# Patient Record
Sex: MALE | Race: OTHER | Employment: OTHER | URBAN - METROPOLITAN AREA
[De-identification: names, ages, dates, MRNs, and addresses within clinical notes are randomized per-mention and may not be internally consistent; named-entity substitution may affect disease eponyms.]

---

## 2018-09-24 ENCOUNTER — HOSPITAL ENCOUNTER (INPATIENT)
Facility: HOSPITAL | Age: 63
LOS: 3 days | Discharge: HOME | DRG: 247 | End: 2018-09-27
Attending: EMERGENCY MEDICINE | Admitting: HOSPITALIST

## 2018-09-24 ENCOUNTER — APPOINTMENT (OUTPATIENT)
Dept: GENERAL RADIOLOGY | Facility: HOSPITAL | Age: 63
DRG: 247 | End: 2018-09-24
Attending: EMERGENCY MEDICINE

## 2018-09-24 DIAGNOSIS — I21.4 NSTEMI, INITIAL EPISODE OF CARE (HCC): Primary | ICD-10-CM

## 2018-09-24 DIAGNOSIS — R07.9 ACUTE CHEST PAIN: ICD-10-CM

## 2018-09-24 LAB
ANION GAP SERPL CALC-SCNC: 13 MMOL/L (ref 0–18)
APTT PPP: 25.9 SECONDS (ref 23.2–35.3)
BASOPHILS # BLD: 0.1 K/UL (ref 0–0.2)
BASOPHILS NFR BLD: 0 %
BUN SERPL-MCNC: 10 MG/DL (ref 8–20)
BUN/CREAT SERPL: 12.8 (ref 10–20)
CALCIUM SERPL-MCNC: 10 MG/DL (ref 8.5–10.5)
CHLORIDE SERPL-SCNC: 101 MMOL/L (ref 95–110)
CHOLEST SERPL-MCNC: 226 MG/DL (ref 110–200)
CO2 SERPL-SCNC: 23 MMOL/L (ref 22–32)
CREAT SERPL-MCNC: 0.78 MG/DL (ref 0.5–1.5)
EOSINOPHIL # BLD: 0 K/UL (ref 0–0.7)
EOSINOPHIL NFR BLD: 0 %
ERYTHROCYTE [DISTWIDTH] IN BLOOD BY AUTOMATED COUNT: 13 % (ref 11–15)
GLUCOSE SERPL-MCNC: 103 MG/DL (ref 70–99)
HBA1C MFR BLD: 4.8 % (ref 4–6)
HCT VFR BLD AUTO: 43.3 % (ref 41–52)
HDLC SERPL-MCNC: 44 MG/DL
HGB BLD-MCNC: 15.1 G/DL (ref 13.5–17.5)
INR BLD: 1 (ref 0.9–1.2)
LDLC SERPL CALC-MCNC: 146 MG/DL (ref 0–99)
LYMPHOCYTES # BLD: 1.7 K/UL (ref 1–4)
LYMPHOCYTES NFR BLD: 11 %
MCH RBC QN AUTO: 31.4 PG (ref 27–32)
MCHC RBC AUTO-ENTMCNC: 34.9 G/DL (ref 32–37)
MCV RBC AUTO: 90.1 FL (ref 80–100)
MONOCYTES # BLD: 0.8 K/UL (ref 0–1)
MONOCYTES NFR BLD: 5 %
NEUTROPHILS # BLD AUTO: 12.3 K/UL (ref 1.8–7.7)
NEUTROPHILS NFR BLD: 83 %
NONHDLC SERPL-MCNC: 182 MG/DL
OSMOLALITY UR CALC.SUM OF ELEC: 283 MOSM/KG (ref 275–295)
PLATELET # BLD AUTO: 307 K/UL (ref 140–400)
PMV BLD AUTO: 7.8 FL (ref 7.4–10.3)
POTASSIUM SERPL-SCNC: 3.6 MMOL/L (ref 3.3–5.1)
PROTHROMBIN TIME: 13.3 SECONDS (ref 11.8–14.5)
RBC # BLD AUTO: 4.81 M/UL (ref 4.5–5.9)
SODIUM SERPL-SCNC: 137 MMOL/L (ref 136–144)
TRIGL SERPL-MCNC: 179 MG/DL (ref 1–149)
TROPONIN I SERPL-MCNC: 12.57 NG/ML (ref ?–0.03)
TROPONIN I SERPL-MCNC: 8.57 NG/ML (ref ?–0.03)
WBC # BLD AUTO: 14.9 K/UL (ref 4–11)

## 2018-09-24 PROCEDURE — 84484 ASSAY OF TROPONIN QUANT: CPT | Performed by: EMERGENCY MEDICINE

## 2018-09-24 PROCEDURE — 96365 THER/PROPH/DIAG IV INF INIT: CPT

## 2018-09-24 PROCEDURE — 85610 PROTHROMBIN TIME: CPT | Performed by: INTERNAL MEDICINE

## 2018-09-24 PROCEDURE — 99291 CRITICAL CARE FIRST HOUR: CPT

## 2018-09-24 PROCEDURE — 85025 COMPLETE CBC W/AUTO DIFF WBC: CPT | Performed by: EMERGENCY MEDICINE

## 2018-09-24 PROCEDURE — 93005 ELECTROCARDIOGRAM TRACING: CPT

## 2018-09-24 PROCEDURE — 93010 ELECTROCARDIOGRAM REPORT: CPT | Performed by: EMERGENCY MEDICINE

## 2018-09-24 PROCEDURE — 85730 THROMBOPLASTIN TIME PARTIAL: CPT | Performed by: EMERGENCY MEDICINE

## 2018-09-24 PROCEDURE — 71045 X-RAY EXAM CHEST 1 VIEW: CPT | Performed by: EMERGENCY MEDICINE

## 2018-09-24 PROCEDURE — 83036 HEMOGLOBIN GLYCOSYLATED A1C: CPT | Performed by: INTERNAL MEDICINE

## 2018-09-24 PROCEDURE — 80061 LIPID PANEL: CPT | Performed by: EMERGENCY MEDICINE

## 2018-09-24 PROCEDURE — 80048 BASIC METABOLIC PNL TOTAL CA: CPT | Performed by: EMERGENCY MEDICINE

## 2018-09-24 RX ORDER — ALPRAZOLAM 0.25 MG/1
0.25 TABLET ORAL NIGHTLY PRN
Status: DISCONTINUED | OUTPATIENT
Start: 2018-09-24 | End: 2018-09-26

## 2018-09-24 RX ORDER — NITROGLYCERIN 0.4 MG/1
0.4 TABLET SUBLINGUAL ONCE
Status: COMPLETED | OUTPATIENT
Start: 2018-09-24 | End: 2018-09-24

## 2018-09-24 RX ORDER — HEPARIN SODIUM 1000 [USP'U]/ML
5000 INJECTION, SOLUTION INTRAVENOUS; SUBCUTANEOUS ONCE
Status: COMPLETED | OUTPATIENT
Start: 2018-09-24 | End: 2018-09-24

## 2018-09-24 RX ORDER — ONDANSETRON 2 MG/ML
4 INJECTION INTRAMUSCULAR; INTRAVENOUS EVERY 4 HOURS PRN
Status: DISCONTINUED | OUTPATIENT
Start: 2018-09-24 | End: 2018-09-27

## 2018-09-24 RX ORDER — HEPARIN SODIUM AND DEXTROSE 10000; 5 [USP'U]/100ML; G/100ML
INJECTION INTRAVENOUS CONTINUOUS
Status: DISCONTINUED | OUTPATIENT
Start: 2018-09-25 | End: 2018-09-26

## 2018-09-24 RX ORDER — ASPIRIN 81 MG/1
324 TABLET, CHEWABLE ORAL ONCE
Status: COMPLETED | OUTPATIENT
Start: 2018-09-24 | End: 2018-09-25

## 2018-09-24 RX ORDER — DOCUSATE SODIUM 100 MG/1
100 CAPSULE, LIQUID FILLED ORAL 2 TIMES DAILY
Status: DISCONTINUED | OUTPATIENT
Start: 2018-09-24 | End: 2018-09-26

## 2018-09-24 RX ORDER — CHLORHEXIDINE GLUCONATE 4 G/100ML
30 SOLUTION TOPICAL
Status: COMPLETED | OUTPATIENT
Start: 2018-09-25 | End: 2018-09-25

## 2018-09-24 RX ORDER — NITROGLYCERIN 20 MG/100ML
10 INJECTION INTRAVENOUS CONTINUOUS
Status: DISCONTINUED | OUTPATIENT
Start: 2018-09-24 | End: 2018-09-25

## 2018-09-24 RX ORDER — SODIUM CHLORIDE 9 MG/ML
INJECTION, SOLUTION INTRAVENOUS CONTINUOUS
Status: DISCONTINUED | OUTPATIENT
Start: 2018-09-24 | End: 2018-09-26

## 2018-09-24 RX ORDER — ATORVASTATIN CALCIUM 40 MG/1
40 TABLET, FILM COATED ORAL NIGHTLY
Status: DISCONTINUED | OUTPATIENT
Start: 2018-09-24 | End: 2018-09-27

## 2018-09-24 RX ORDER — POLYETHYLENE GLYCOL 3350 17 G/17G
17 POWDER, FOR SOLUTION ORAL DAILY PRN
Status: DISCONTINUED | OUTPATIENT
Start: 2018-09-24 | End: 2018-09-27

## 2018-09-24 RX ORDER — SODIUM PHOSPHATE, DIBASIC AND SODIUM PHOSPHATE, MONOBASIC 7; 19 G/133ML; G/133ML
1 ENEMA RECTAL ONCE AS NEEDED
Status: DISCONTINUED | OUTPATIENT
Start: 2018-09-24 | End: 2018-09-27

## 2018-09-24 RX ORDER — BISACODYL 10 MG
10 SUPPOSITORY, RECTAL RECTAL
Status: DISCONTINUED | OUTPATIENT
Start: 2018-09-24 | End: 2018-09-27

## 2018-09-24 RX ORDER — EPTIFIBATIDE 0.75 MG/ML
2 INJECTION, SOLUTION INTRAVENOUS CONTINUOUS
Status: DISCONTINUED | OUTPATIENT
Start: 2018-09-24 | End: 2018-09-27

## 2018-09-24 RX ORDER — SODIUM CHLORIDE 0.9 % (FLUSH) 0.9 %
3 SYRINGE (ML) INJECTION AS NEEDED
Status: DISCONTINUED | OUTPATIENT
Start: 2018-09-24 | End: 2018-09-27

## 2018-09-24 RX ORDER — ACETAMINOPHEN 325 MG/1
650 TABLET ORAL EVERY 6 HOURS PRN
Status: DISCONTINUED | OUTPATIENT
Start: 2018-09-24 | End: 2018-09-27

## 2018-09-24 RX ORDER — HEPARIN SODIUM AND DEXTROSE 10000; 5 [USP'U]/100ML; G/100ML
12 INJECTION INTRAVENOUS ONCE
Status: COMPLETED | OUTPATIENT
Start: 2018-09-24 | End: 2018-09-25

## 2018-09-24 RX ORDER — ASPIRIN 81 MG/1
324 TABLET, CHEWABLE ORAL ONCE
Status: COMPLETED | OUTPATIENT
Start: 2018-09-24 | End: 2018-09-24

## 2018-09-25 ENCOUNTER — APPOINTMENT (OUTPATIENT)
Dept: INTERVENTIONAL RADIOLOGY/VASCULAR | Facility: HOSPITAL | Age: 63
DRG: 247 | End: 2018-09-25
Attending: INTERNAL MEDICINE

## 2018-09-25 ENCOUNTER — APPOINTMENT (OUTPATIENT)
Dept: CV DIAGNOSTICS | Facility: HOSPITAL | Age: 63
DRG: 247 | End: 2018-09-25
Attending: INTERNAL MEDICINE

## 2018-09-25 LAB
ANION GAP SERPL CALC-SCNC: 10 MMOL/L (ref 0–18)
APTT PPP: 37.1 SECONDS (ref 23.2–35.3)
APTT PPP: 66.8 SECONDS (ref 23.2–35.3)
BUN SERPL-MCNC: 9 MG/DL (ref 8–20)
BUN/CREAT SERPL: 12.5 (ref 10–20)
CALCIUM SERPL-MCNC: 9.7 MG/DL (ref 8.5–10.5)
CHLORIDE SERPL-SCNC: 101 MMOL/L (ref 95–110)
CO2 SERPL-SCNC: 24 MMOL/L (ref 22–32)
CREAT SERPL-MCNC: 0.72 MG/DL (ref 0.5–1.5)
ERYTHROCYTE [DISTWIDTH] IN BLOOD BY AUTOMATED COUNT: 12.9 % (ref 11–15)
GLUCOSE SERPL-MCNC: 118 MG/DL (ref 70–99)
HCT VFR BLD AUTO: 42.8 % (ref 41–52)
HGB BLD-MCNC: 14.9 G/DL (ref 13.5–17.5)
INR BLD: 1.1 (ref 0.9–1.2)
MAGNESIUM SERPL-MCNC: 1.8 MG/DL (ref 1.8–2.5)
MCH RBC QN AUTO: 31.5 PG (ref 27–32)
MCHC RBC AUTO-ENTMCNC: 34.9 G/DL (ref 32–37)
MCV RBC AUTO: 90.5 FL (ref 80–100)
OSMOLALITY UR CALC.SUM OF ELEC: 280 MOSM/KG (ref 275–295)
PLATELET # BLD AUTO: 290 K/UL (ref 140–400)
PMV BLD AUTO: 7.9 FL (ref 7.4–10.3)
POTASSIUM SERPL-SCNC: 3.7 MMOL/L (ref 3.3–5.1)
PROTHROMBIN TIME: 14.3 SECONDS (ref 11.8–14.5)
RBC # BLD AUTO: 4.73 M/UL (ref 4.5–5.9)
SODIUM SERPL-SCNC: 135 MMOL/L (ref 136–144)
TSH SERPL-ACNC: 0.83 UIU/ML (ref 0.45–5.33)
WBC # BLD AUTO: 15.1 K/UL (ref 4–11)

## 2018-09-25 PROCEDURE — 85027 COMPLETE CBC AUTOMATED: CPT | Performed by: INTERNAL MEDICINE

## 2018-09-25 PROCEDURE — 99152 MOD SED SAME PHYS/QHP 5/>YRS: CPT

## 2018-09-25 PROCEDURE — 85730 THROMBOPLASTIN TIME PARTIAL: CPT | Performed by: INTERNAL MEDICINE

## 2018-09-25 PROCEDURE — 93306 TTE W/DOPPLER COMPLETE: CPT | Performed by: INTERNAL MEDICINE

## 2018-09-25 PROCEDURE — 84443 ASSAY THYROID STIM HORMONE: CPT | Performed by: INTERNAL MEDICINE

## 2018-09-25 PROCEDURE — 93010 ELECTROCARDIOGRAM REPORT: CPT | Performed by: INTERNAL MEDICINE

## 2018-09-25 PROCEDURE — 027034Z DILATION OF CORONARY ARTERY, ONE ARTERY WITH DRUG-ELUTING INTRALUMINAL DEVICE, PERCUTANEOUS APPROACH: ICD-10-PCS | Performed by: INTERNAL MEDICINE

## 2018-09-25 PROCEDURE — 93454 CORONARY ARTERY ANGIO S&I: CPT

## 2018-09-25 PROCEDURE — 93005 ELECTROCARDIOGRAM TRACING: CPT

## 2018-09-25 PROCEDURE — B2111ZZ FLUOROSCOPY OF MULTIPLE CORONARY ARTERIES USING LOW OSMOLAR CONTRAST: ICD-10-PCS | Performed by: INTERNAL MEDICINE

## 2018-09-25 PROCEDURE — 80048 BASIC METABOLIC PNL TOTAL CA: CPT | Performed by: INTERNAL MEDICINE

## 2018-09-25 PROCEDURE — 83735 ASSAY OF MAGNESIUM: CPT | Performed by: INTERNAL MEDICINE

## 2018-09-25 PROCEDURE — 85610 PROTHROMBIN TIME: CPT | Performed by: INTERNAL MEDICINE

## 2018-09-25 PROCEDURE — 99153 MOD SED SAME PHYS/QHP EA: CPT

## 2018-09-25 PROCEDURE — A4216 STERILE WATER/SALINE, 10 ML: HCPCS | Performed by: INTERNAL MEDICINE

## 2018-09-25 PROCEDURE — 4A023N7 MEASUREMENT OF CARDIAC SAMPLING AND PRESSURE, LEFT HEART, PERCUTANEOUS APPROACH: ICD-10-PCS | Performed by: INTERNAL MEDICINE

## 2018-09-25 RX ORDER — ASPIRIN 325 MG
325 TABLET, DELAYED RELEASE (ENTERIC COATED) ORAL DAILY
Status: DISCONTINUED | OUTPATIENT
Start: 2018-09-26 | End: 2018-09-26

## 2018-09-25 RX ORDER — HEPARIN SODIUM 1000 [USP'U]/ML
30 INJECTION, SOLUTION INTRAVENOUS; SUBCUTANEOUS ONCE
Status: COMPLETED | OUTPATIENT
Start: 2018-09-25 | End: 2018-09-25

## 2018-09-25 RX ORDER — MIDAZOLAM HYDROCHLORIDE 1 MG/ML
INJECTION INTRAMUSCULAR; INTRAVENOUS
Status: COMPLETED
Start: 2018-09-25 | End: 2018-09-25

## 2018-09-25 RX ORDER — WARFARIN SODIUM 5 MG/1
5 TABLET ORAL NIGHTLY
Status: DISCONTINUED | OUTPATIENT
Start: 2018-09-25 | End: 2018-09-27

## 2018-09-25 RX ORDER — EPTIFIBATIDE 0.75 MG/ML
1 INJECTION, SOLUTION INTRAVENOUS CONTINUOUS
Status: DISPENSED | OUTPATIENT
Start: 2018-09-25 | End: 2018-09-26

## 2018-09-25 RX ORDER — PRASUGREL 10 MG/1
TABLET, FILM COATED ORAL
Status: COMPLETED
Start: 2018-09-25 | End: 2018-09-25

## 2018-09-25 RX ORDER — PRASUGREL 10 MG/1
10 TABLET, FILM COATED ORAL DAILY
Status: DISCONTINUED | OUTPATIENT
Start: 2018-09-26 | End: 2018-09-27

## 2018-09-25 RX ORDER — VERAPAMIL HYDROCHLORIDE 2.5 MG/ML
INJECTION, SOLUTION INTRAVENOUS
Status: COMPLETED
Start: 2018-09-25 | End: 2018-09-25

## 2018-09-25 RX ORDER — NITROGLYCERIN 20 MG/100ML
INJECTION INTRAVENOUS
Status: COMPLETED
Start: 2018-09-25 | End: 2018-09-25

## 2018-09-25 RX ORDER — SODIUM CHLORIDE 9 MG/ML
INJECTION, SOLUTION INTRAVENOUS CONTINUOUS
Status: ACTIVE | OUTPATIENT
Start: 2018-09-25 | End: 2018-09-25

## 2018-09-25 RX ORDER — MIDAZOLAM HYDROCHLORIDE 1 MG/ML
INJECTION INTRAMUSCULAR; INTRAVENOUS
Status: DISCONTINUED
Start: 2018-09-25 | End: 2018-09-25 | Stop reason: WASHOUT

## 2018-09-25 RX ORDER — HEPARIN SODIUM 1000 [USP'U]/ML
INJECTION, SOLUTION INTRAVENOUS; SUBCUTANEOUS
Status: COMPLETED
Start: 2018-09-25 | End: 2018-09-25

## 2018-09-25 NOTE — ED NOTES
Language line used to translate for patient. Sarah [phone]. Diagnosis explained, medications reviewed and patient updated on plan of care. Friends at bedside. Patient verbalized understanding. Critical troponin reported to Dr. Mg Alves.

## 2018-09-25 NOTE — PROGRESS NOTES
Post cath note  cath showed subtotaled lad in mid vessel  circ and rca were ok  Echo lv showed ant apical hypokinesia with apical clot; ef 45%  Lad was opened with lore and raymundo 2-3 flow  Pt started on effient and aspirin  Integrilin will run for 18 hours

## 2018-09-25 NOTE — ED PROVIDER NOTES
Patient Seen in: [facility] AND [facility] Emergency Department    History   Patient presents with:  Chest Pain Angina (cardiovascular)      HPI    The patient presents to the ED with his family concerned about right-sided chest pain that radiates to his right u Narrative      Not on file      ROS  Pertinent Positives: Chest pain  All other organ systems are reviewed and are negative. Constitutional and vital signs reviewed.       Social History and Family History elements reviewed from today, pertinent positive All other components within normal limits   CBC W/ DIFFERENTIAL - Abnormal; Notable for the following components:    WBC 14.9 (*)     Neutrophil Absolute 12.3 (*)     All other components within normal limits   PTT, ACTIVATED - Normal   HEMOGLOBIN A1C - No (INTEGRILIN) bolus injection 16,620 mcg (not administered)   eptifibatide (INTEGRILIN) 75 MG/100ML infusion (not administered)   aspirin chewable tab 324 mg (324 mg Oral Given 9/24/18 1906)   nitroGLYCERIN (NITROSTAT) SL tab 0.4 mg (0.4 mg Sublingual Given obtaining history, performing a physical exam, bedside monitoring of interventions, collecting and interpreting tests and discussion with consultants but not including time spent performing procedures.     Condition upon leaving the department: Stable    Leo Sow

## 2018-09-25 NOTE — H&P
[facility] Hospitalist H&P       CC: Patient presents with:  Chest Pain Angina (cardiovascular)       PCP: No primary care provider on file. ASSESSMENT / PLAN:   Patient is a 57year old male with PMHx sig for HTN who presents with a c/co of chest pain.     NST Alcohol use: No      Frequency: Never       Fam Hx  Denied family hx of heart disease    Review of Systems  Pertinent positives and negatives noted above in HPI, all other systems reviewed and are negative     OBJECTIVE:  /86 (BP Location: Right arm) 09/25/2018    CREATSERUM 0.72 09/25/2018    BUN 9 09/25/2018     09/25/2018    K 3.7 09/25/2018     09/25/2018    CO2 24 09/25/2018     09/25/2018    CA 9.7 09/25/2018    PTT 66.8 09/25/2018    INR 1.1 09/25/2018    TSH 0.83 09/25/2018

## 2018-09-25 NOTE — CM/SW NOTE
SW received MD order to eval for home needs, social support and ability to obtain medications. Per chart review and discussion w/ MEIR/Nuha, pt is from home w/ his spouse in [facility], [facility].  Pt is Irish speaking only and has a relative contact phon

## 2018-09-25 NOTE — PHYSICAL THERAPY NOTE
Attempted PT assessment- hold today per RNFrancisca. Blood thinners initiated and not appropriate for mobility at this time.  Will reschedule for 9/26

## 2018-09-25 NOTE — CONSULTS
Reason for Consultation: Acute coronary syndrome    Assessment/Plan:     1. Acute MI  - started 20 hours ago  - presently pain-free    PLAN:  - heparin, ASA.  IV NTG, Integrilin  - BB  - cath possible in AM    Discussed through family friend who translated 1930 (!) 153/96 — — 63 19 96 % — —   09/24/18 1915 (!) 182/96 — — 113 20 94 % — —   09/24/18 1910 (!) 182/96 — — 64 18 96 % — —   09/24/18 1819 (!) 174/95 98.2 °F (36.8 °C) Oral 69 19 97 % 6' 0.84\" (1.85 m) 187 lb 6.3 oz (85 kg)     No intake or output da recognition system and may contain unintended errors.

## 2018-09-25 NOTE — OCCUPATIONAL THERAPY NOTE
Orders received, chart reviewed. Discussed with DICKSON Holman. Pt will be off unit in cath lab this AM --anticipates pt will be appropriate late this PM. Will re-attempt as schedule allows.      PM attempt: DICKSON Holman reports pt not appropriate for activity today, t

## 2018-09-25 NOTE — PLAN OF CARE
Patient Centered Care    • Patient preferences are identified and integrated in the patient's plan of care Progressing        Patient/Family Goals    • Patient/Family Long Term Goal Progressing    • Patient/Family Short Term Goal Progressing        Pt safe

## 2018-09-26 LAB
ANION GAP SERPL CALC-SCNC: 7 MMOL/L (ref 0–18)
BUN SERPL-MCNC: 17 MG/DL (ref 8–20)
BUN/CREAT SERPL: 21.3 (ref 10–20)
CALCIUM SERPL-MCNC: 9.3 MG/DL (ref 8.5–10.5)
CHLORIDE SERPL-SCNC: 105 MMOL/L (ref 95–110)
CO2 SERPL-SCNC: 25 MMOL/L (ref 22–32)
CREAT SERPL-MCNC: 0.8 MG/DL (ref 0.5–1.5)
ERYTHROCYTE [DISTWIDTH] IN BLOOD BY AUTOMATED COUNT: 12.6 % (ref 11–15)
GLUCOSE SERPL-MCNC: 104 MG/DL (ref 70–99)
HCT VFR BLD AUTO: 42.4 % (ref 41–52)
HGB BLD-MCNC: 14.6 G/DL (ref 13.5–17.5)
INR BLD: 1.1 (ref 0.9–1.2)
MCH RBC QN AUTO: 31.4 PG (ref 27–32)
MCHC RBC AUTO-ENTMCNC: 34.5 G/DL (ref 32–37)
MCV RBC AUTO: 91.1 FL (ref 80–100)
OSMOLALITY UR CALC.SUM OF ELEC: 286 MOSM/KG (ref 275–295)
PLATELET # BLD AUTO: 278 K/UL (ref 140–400)
PMV BLD AUTO: 8.1 FL (ref 7.4–10.3)
POTASSIUM SERPL-SCNC: 3.6 MMOL/L (ref 3.3–5.1)
PROTHROMBIN TIME: 13.7 SECONDS (ref 11.8–14.5)
RBC # BLD AUTO: 4.65 M/UL (ref 4.5–5.9)
SODIUM SERPL-SCNC: 137 MMOL/L (ref 136–144)
WBC # BLD AUTO: 12.1 K/UL (ref 4–11)

## 2018-09-26 PROCEDURE — 97165 OT EVAL LOW COMPLEX 30 MIN: CPT

## 2018-09-26 PROCEDURE — 80048 BASIC METABOLIC PNL TOTAL CA: CPT | Performed by: INTERNAL MEDICINE

## 2018-09-26 PROCEDURE — 85027 COMPLETE CBC AUTOMATED: CPT | Performed by: INTERNAL MEDICINE

## 2018-09-26 PROCEDURE — 97161 PT EVAL LOW COMPLEX 20 MIN: CPT

## 2018-09-26 PROCEDURE — 85610 PROTHROMBIN TIME: CPT | Performed by: HOSPITALIST

## 2018-09-26 RX ORDER — ASPIRIN 81 MG/1
81 TABLET ORAL DAILY
Status: DISCONTINUED | OUTPATIENT
Start: 2018-09-27 | End: 2018-09-27

## 2018-09-26 RX ORDER — LISINOPRIL 5 MG/1
5 TABLET ORAL DAILY
Status: DISCONTINUED | OUTPATIENT
Start: 2018-09-26 | End: 2018-09-27

## 2018-09-26 NOTE — CM/SW NOTE
9/26CM-MD orders received in regards to discharge planning-Tourist from [facility], had MI, needs cardiac meds, and warfarin, with follow up.  May need infor for VNA free clinic as well as resources for medication assistance.  Case Management to provide inform

## 2018-09-26 NOTE — PROGRESS NOTES
[facility] Hospitalist Progress Note     CC: Hospital Follow up    PCP: No primary care provider on file.        Assessment/Plan:     Principal Problem:    NSTEMI, initial episode of care [facility])  Active Problems:    Acute chest pain    Patient is a 57year old male 1000 ml   Net 287.6 ml       Last 3 Weights  09/26/18 0600 : 196 lb 6.4 oz (89.1 kg)  09/24/18 2300 : 201 lb 6.4 oz (91.4 kg)  09/24/18 2022 : 203 lb 7.8 oz (92.3 kg)  09/24/18 1819 : 187 lb 6.3 oz (85 kg)      Exam    Gen: No acute distress, alert and emperatriz ondansetron HCl, Normal Saline Flush, acetaminophen, PEG 3350, magnesium hydroxide, bisacodyl, FLEET ENEMA

## 2018-09-26 NOTE — PHYSICAL THERAPY NOTE
PHYSICAL THERAPY EVALUATION - INPATIENT     Room Number: 058/873-N  Evaluation Date: 9/26/2018  Type of Evaluation: Initial   Physician Order: PT Eval and Treat    Presenting Problem: NSTEMI  Reason for Therapy: Mobility Dysfunction and Discharge Planning Problems:    Acute chest pain      Past Medical History  Past Medical History:   Diagnosis Date   • Essential hypertension        Past Surgical History  Past Surgical History:  No date: ADENOIDECTOMY    HOME SITUATION  Type of Home: (Patient visiting from Modifier (G-Code): CI    FUNCTIONAL ABILITY STATUS  Gait Assessment   Gait Assistance: Supervision  Distance (ft): 400ft  Assistive Device: None  Pattern: Within Functional Limits          Bed Mobility: Independent    Transfers: Independent    Exercise/Edu

## 2018-09-26 NOTE — CARDIAC REHAB
Cardiac Rehab Phase I    Activity:  Distance   Assistance needed   Patient tolerated activity . Education:  Handouts provided and reviewed: [address] Diet: Healthy Cardiac diet reviewed.     Disease Process: Disease process rev

## 2018-09-26 NOTE — OCCUPATIONAL THERAPY NOTE
OCCUPATIONAL THERAPY EVALUATION - INPATIENT     Room Number: 159/327-N  Evaluation Date: 9/26/2018  Type of Evaluation: Initial  Presenting Problem: acute MI, NSTEMI    Physician Order: IP Consult to Occupational Therapy  Reason for Therapy: ADL/IADL Dys Problem:    NSTEMI, initial episode of care [facility])  Active Problems:    Acute chest pain      Past Medical History  Past Medical History:   Diagnosis Date   • Essential hypertension        Past Surgical History  Past Surgical History:  No date: ADENOIDECTOM Mobility: spv    BALANCE ASSESSMENT  Static Sitting: good  Dynamic Sitting: good  Static Standing: good  Dynamic Standing: good    FUNCTIONAL ADL ASSESSMENT  Grooming: spv for standing balance  Feeding: ind  Bathing: NT  Toileting: ind  Upper Extremity Dago

## 2018-09-26 NOTE — PROGRESS NOTES
Assessment and Plan:     1. Acute Anterior MI  - started 20 hours before presentation  - NIRMALA to LAD 9/25  2. LV mural thrombus  - start warfarin  3.  HLD    PLAN:  - warfarin, Effient, ASA-81 (stop ASA after 1 month  - tele  - DONY PATEL      Subjective: Interpretation  -------------------------- Sinus Rhythm - occasional PAC  -Left atrial enlargement.  -Anterior infarct t.  - Precordial T-wave abnormality -Possible anterolateral ischemia.  ABNORMAL When compared with ECG of 09/24/2018 19:05:21 No significa

## 2018-09-27 VITALS
OXYGEN SATURATION: 97 % | SYSTOLIC BLOOD PRESSURE: 120 MMHG | HEART RATE: 58 BPM | TEMPERATURE: 97 F | WEIGHT: 196.81 LBS | RESPIRATION RATE: 18 BRPM | HEIGHT: 72.84 IN | BODY MASS INDEX: 26.08 KG/M2 | DIASTOLIC BLOOD PRESSURE: 78 MMHG

## 2018-09-27 LAB
ALBUMIN SERPL BCP-MCNC: 3.6 G/DL (ref 3.5–4.8)
ALBUMIN/GLOB SERPL: 1.1 {RATIO} (ref 1–2)
ALP SERPL-CCNC: 55 U/L (ref 32–100)
ALT SERPL-CCNC: 25 U/L (ref 17–63)
ANION GAP SERPL CALC-SCNC: 12 MMOL/L (ref 0–18)
AST SERPL-CCNC: 32 U/L (ref 15–41)
BASOPHILS # BLD: 0.1 K/UL (ref 0–0.2)
BASOPHILS NFR BLD: 1 %
BILIRUB SERPL-MCNC: 1 MG/DL (ref 0.3–1.2)
BUN SERPL-MCNC: 33 MG/DL (ref 8–20)
BUN/CREAT SERPL: 25.8 (ref 10–20)
CALCIUM SERPL-MCNC: 9.3 MG/DL (ref 8.5–10.5)
CHLORIDE SERPL-SCNC: 100 MMOL/L (ref 95–110)
CHOLEST SERPL-MCNC: 170 MG/DL (ref 110–200)
CO2 SERPL-SCNC: 25 MMOL/L (ref 22–32)
CREAT SERPL-MCNC: 1.28 MG/DL (ref 0.5–1.5)
EOSINOPHIL # BLD: 0.2 K/UL (ref 0–0.7)
EOSINOPHIL NFR BLD: 2 %
ERYTHROCYTE [DISTWIDTH] IN BLOOD BY AUTOMATED COUNT: 12.9 % (ref 11–15)
GLOBULIN PLAS-MCNC: 3.3 G/DL (ref 2.5–3.7)
GLUCOSE SERPL-MCNC: 102 MG/DL (ref 70–99)
HCT VFR BLD AUTO: 40.8 % (ref 41–52)
HDLC SERPL-MCNC: 41 MG/DL
HGB BLD-MCNC: 14.2 G/DL (ref 13.5–17.5)
INR BLD: 1.2 (ref 0.9–1.2)
LDLC SERPL CALC-MCNC: 108 MG/DL (ref 0–99)
LYMPHOCYTES # BLD: 2.2 K/UL (ref 1–4)
LYMPHOCYTES NFR BLD: 20 %
MAGNESIUM SERPL-MCNC: 2.1 MG/DL (ref 1.8–2.5)
MCH RBC QN AUTO: 31.7 PG (ref 27–32)
MCHC RBC AUTO-ENTMCNC: 34.7 G/DL (ref 32–37)
MCV RBC AUTO: 91.5 FL (ref 80–100)
MONOCYTES # BLD: 0.9 K/UL (ref 0–1)
MONOCYTES NFR BLD: 9 %
NEUTROPHILS # BLD AUTO: 7.5 K/UL (ref 1.8–7.7)
NEUTROPHILS NFR BLD: 69 %
NONHDLC SERPL-MCNC: 129 MG/DL
OSMOLALITY UR CALC.SUM OF ELEC: 291 MOSM/KG (ref 275–295)
PLATELET # BLD AUTO: 260 K/UL (ref 140–400)
PMV BLD AUTO: 7.9 FL (ref 7.4–10.3)
POTASSIUM SERPL-SCNC: 3.7 MMOL/L (ref 3.3–5.1)
PROT SERPL-MCNC: 6.9 G/DL (ref 5.9–8.4)
PROTHROMBIN TIME: 14.9 SECONDS (ref 11.8–14.5)
RBC # BLD AUTO: 4.46 M/UL (ref 4.5–5.9)
SODIUM SERPL-SCNC: 137 MMOL/L (ref 136–144)
TRIGL SERPL-MCNC: 107 MG/DL (ref 1–149)
WBC # BLD AUTO: 10.9 K/UL (ref 4–11)

## 2018-09-27 PROCEDURE — 80053 COMPREHEN METABOLIC PANEL: CPT | Performed by: HOSPITALIST

## 2018-09-27 PROCEDURE — 83735 ASSAY OF MAGNESIUM: CPT | Performed by: HOSPITALIST

## 2018-09-27 PROCEDURE — 80061 LIPID PANEL: CPT | Performed by: HOSPITALIST

## 2018-09-27 PROCEDURE — 85610 PROTHROMBIN TIME: CPT | Performed by: HOSPITALIST

## 2018-09-27 PROCEDURE — 90471 IMMUNIZATION ADMIN: CPT

## 2018-09-27 PROCEDURE — 85025 COMPLETE CBC W/AUTO DIFF WBC: CPT | Performed by: HOSPITALIST

## 2018-09-27 PROCEDURE — 3E023GC INTRODUCTION OF OTHER THERAPEUTIC SUBSTANCE INTO MUSCLE, PERCUTANEOUS APPROACH: ICD-10-PCS | Performed by: HOSPITALIST

## 2018-09-27 RX ORDER — PRASUGREL 10 MG/1
10 TABLET, FILM COATED ORAL DAILY
Qty: 30 TABLET | Refills: 0 | Status: SHIPPED | OUTPATIENT
Start: 2018-09-28

## 2018-09-27 RX ORDER — LISINOPRIL 5 MG/1
5 TABLET ORAL DAILY
Qty: 30 TABLET | Refills: 0 | Status: SHIPPED | OUTPATIENT
Start: 2018-09-28 | End: 2018-10-28

## 2018-09-27 RX ORDER — ATORVASTATIN CALCIUM 40 MG/1
40 TABLET, FILM COATED ORAL NIGHTLY
Qty: 30 TABLET | Refills: 0 | Status: SHIPPED | OUTPATIENT
Start: 2018-09-27

## 2018-09-27 RX ORDER — WARFARIN SODIUM 5 MG/1
5 TABLET ORAL NIGHTLY
Qty: 30 TABLET | Refills: 0 | Status: SHIPPED | OUTPATIENT
Start: 2018-09-27

## 2018-09-27 RX ORDER — ASPIRIN 81 MG/1
81 TABLET ORAL DAILY
Qty: 30 TABLET | Refills: 0 | Status: SHIPPED | OUTPATIENT
Start: 2018-09-28

## 2018-09-27 NOTE — PLAN OF CARE
Spent one hour with patient and family on language line, patient fully understands to follow up with Coumadin clinic to have INR checked at [facility] clinic and follow up with Dr. Wilbur De Souza.

## 2018-09-27 NOTE — DISCHARGE SUMMARY
General Medicine Discharge Summary     Patient ID:  Eliel Mijares  57year old  12/14/1955    Admit date: 9/24/2018    Discharge date and time: 9/27/18    Attending Physician: Jesús Lyles MD     Consults: IP CONSULT TO CARDIOLOGY  IP CONSULT TO CARDIOLOGY started. No cp or dyspnea on exertion at baseline. No leg swelling orthopnea or pnd.  No fever/chills, cough or other complaints noted.      Hospital Course:   Patient is a 58 year old male with PMHx sig for HTN who presents with a c/co of chest pain, found MD on 9/24/2018 at 19:55              Disposition: home    Activity: activity as tolerated  Diet: cardiac diet  Wound Care: as directed  Code Status: Full Code  O2: none    Home Medication Changes:     Med list     Medication List      START taking these m monitored very closely with frequent blood test to check levels. Please arrange this with cardiology and outpatient clinic [facility] clinic. You will need to continue all other medicines indefinitely per cardiology.          Medication List      START taking the

## 2018-09-27 NOTE — PROGRESS NOTES
Assessment and Plan:     1. Acute Anterior MI  - started 20 hours before presentation  - NIRMALA to LAD 9/25  2. LV mural thrombus  - start warfarin  3.  HLD    PLAN:  - warfarin, Effient, ASA-81 (stop ASA after 1 month  - tele  - ACEIDONY  Dc planning and fo signed on 09/26/2018 at 15:34 by Harry Thomas

## 2018-09-27 NOTE — CM/SW NOTE
SW self-referred to meet with patient due to case findings and diagnosis. Pt is visiting from [facility] and does not speak english.  SW provided pt with [facility] healthcare resources and explained via the language line that he will need to follow up at the [facility]

## 2018-09-27 NOTE — CM/SW NOTE
Patient is self pay from [facility]. Patient was seen by Trinidad Haywood where he was given resources for the VNA clinic in [facility].  He was informed that he will have to have his INR checked at this clinic on a regular basis, as instructed by the staff at the

## 2018-10-03 PROBLEM — I21.4 NSTEMI (NON-ST ELEVATED MYOCARDIAL INFARCTION) (HCC): Status: ACTIVE | Noted: 2018-09-24

## 2018-10-03 PROBLEM — E78.2 MIXED HYPERLIPIDEMIA: Status: ACTIVE | Noted: 2018-10-03

## 2018-10-03 PROBLEM — I51.3 LEFT VENTRICULAR MURAL THROMBUS: Status: ACTIVE | Noted: 2018-10-03

## 2018-10-10 ENCOUNTER — TELEPHONE (OUTPATIENT)
Dept: MEDSURG UNIT | Facility: HOSPITAL | Age: 63
End: 2018-10-10

## (undated) NOTE — LETTER
[address], [place]  Authorization for Invasive Procedures  1.  I hereby authorize Dr. Bora King , my physician and whomever may be designated as the doctor's assistant, to perform the following operation and/or procedure:  CARDIAC CAT 5. I consent to the photographing of the operations or procedures to be performed for the purposes of advancing medicine, science, and/or education, provided my identity is not revealed.  If the procedure has been videotaped, the physician/surgeon will ob __________ Time: ___________    Statement of Physician  My signature below affirms that prior to the time of the procedure, I have explained to the patient and/or his legal representative, the risks and benefits involved in the proposed treatment and any r